# Patient Record
Sex: FEMALE | Race: WHITE | NOT HISPANIC OR LATINO | Employment: FULL TIME | URBAN - METROPOLITAN AREA
[De-identification: names, ages, dates, MRNs, and addresses within clinical notes are randomized per-mention and may not be internally consistent; named-entity substitution may affect disease eponyms.]

---

## 2017-05-12 ENCOUNTER — TELEPHONE (OUTPATIENT)
Dept: FAMILY MEDICINE CLINIC | Facility: CLINIC | Age: 57
End: 2017-05-12

## 2017-05-12 RX ORDER — ESZOPICLONE 3 MG/1
TABLET, FILM COATED ORAL
Qty: 30 TABLET | Refills: 2 | OUTPATIENT
Start: 2017-05-12

## 2017-05-12 RX ORDER — LIDOCAINE 50 MG/G
PATCH TOPICAL
Refills: 4 | OUTPATIENT
Start: 2017-05-12

## 2017-06-07 ENCOUNTER — OFFICE VISIT (OUTPATIENT)
Dept: FAMILY MEDICINE CLINIC | Facility: CLINIC | Age: 57
End: 2017-06-07

## 2017-06-07 VITALS
OXYGEN SATURATION: 99 % | HEIGHT: 65 IN | SYSTOLIC BLOOD PRESSURE: 106 MMHG | DIASTOLIC BLOOD PRESSURE: 58 MMHG | HEART RATE: 83 BPM | WEIGHT: 129 LBS | TEMPERATURE: 98.9 F | BODY MASS INDEX: 21.49 KG/M2

## 2017-06-07 DIAGNOSIS — G25.81 RESTLESS LEG SYNDROME: Primary | ICD-10-CM

## 2017-06-07 DIAGNOSIS — G47.09 OTHER INSOMNIA: ICD-10-CM

## 2017-06-07 DIAGNOSIS — Z79.890 HORMONE REPLACEMENT THERAPY (HRT): ICD-10-CM

## 2017-06-07 PROCEDURE — 99213 OFFICE O/P EST LOW 20 MIN: CPT | Performed by: FAMILY MEDICINE

## 2017-06-07 RX ORDER — ROPINIROLE 0.5 MG/1
0.5 TABLET, FILM COATED ORAL NIGHTLY
Qty: 30 TABLET | Refills: 11 | Status: SHIPPED | OUTPATIENT
Start: 2017-06-07 | End: 2018-06-26 | Stop reason: SDUPTHER

## 2017-06-07 RX ORDER — GABAPENTIN 100 MG/1
CAPSULE ORAL
Qty: 60 CAPSULE | Refills: 11 | Status: SHIPPED | OUTPATIENT
Start: 2017-06-07 | End: 2018-10-12

## 2017-06-07 RX ORDER — VALACYCLOVIR HYDROCHLORIDE 1 G/1
TABLET, FILM COATED ORAL
Refills: 0 | COMMUNITY
Start: 2017-05-30 | End: 2019-03-28 | Stop reason: SDUPTHER

## 2017-06-07 NOTE — PROGRESS NOTES
Subjective   Lanny Andersen is a 56 y.o. female    HPI Comments: Patient presents for recheck regarding restless leg syndrome, insomnia and hormone replacement therapy.  She is due for prescription refills.  She has weaned herself down on her Requip to 0.5 mg at bedtime.  She has found that using gabapentin works as well for sleep as the Lunesta did without any morning hangover symptoms.  She needs a refill on her combo patch for hormone replacement therapy.  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.  She has no acute medical complaints today.    Insomnia   Pertinent negatives include no abdominal pain, arthralgias, chest pain, coughing, fatigue, headaches, myalgias, nausea, vomiting or weakness.       The following portions of the patient's history were reviewed and updated as appropriate: allergies, current medications, past social history and problem list    Review of Systems   Constitutional: Negative for fatigue and unexpected weight change.   Respiratory: Negative for cough and shortness of breath.    Cardiovascular: Negative for chest pain and palpitations.   Gastrointestinal: Negative for abdominal pain, nausea and vomiting.   Endocrine: Negative for polydipsia, polyphagia and polyuria.   Musculoskeletal: Negative for arthralgias and myalgias.   Neurological: Negative for tremors, weakness, light-headedness and headaches.   Psychiatric/Behavioral: Positive for sleep disturbance. Negative for agitation, behavioral problems, confusion, decreased concentration, dysphoric mood and hallucinations. The patient has insomnia. The patient is not nervous/anxious and is not hyperactive.        Objective     Vitals:    06/07/17 1353   BP: 106/58   Pulse: 83   Temp: 98.9 °F (37.2 °C)   SpO2: 99%       Physical Exam   Constitutional: She is oriented to person, place, and time. She appears well-developed and well-nourished. No distress.   HENT:   Head: Normocephalic and atraumatic.   Eyes: Conjunctivae are normal.  Pupils are equal, round, and reactive to light.   Neck: No thyromegaly present.   Cardiovascular: Normal rate and regular rhythm.    Pulmonary/Chest: Effort normal and breath sounds normal.   Lymphadenopathy:     She has no cervical adenopathy.   Neurological: She is alert and oriented to person, place, and time. Coordination normal.   Skin: Skin is warm and dry. She is not diaphoretic.   Psychiatric: She has a normal mood and affect. Her behavior is normal. Judgment and thought content normal. Cognition and memory are normal. She is attentive.   Nursing note and vitals reviewed.      Assessment/Plan   Problem List Items Addressed This Visit        Other    Restless leg syndrome - Primary    Relevant Medications    rOPINIRole (REQUIP) 0.5 MG tablet    Hormone replacement therapy (HRT)    Relevant Medications    estradiol-norethindrone (COMBIPATCH) 0.05-0.14 MG/DAY patch (Start on 6/8/2017)      Other Visit Diagnoses     Other insomnia        Relevant Medications    gabapentin (NEURONTIN) 100 MG capsule

## 2017-07-19 ENCOUNTER — TELEPHONE (OUTPATIENT)
Dept: FAMILY MEDICINE CLINIC | Facility: CLINIC | Age: 57
End: 2017-07-19

## 2017-07-19 RX ORDER — ESZOPICLONE 2 MG/1
TABLET, FILM COATED ORAL
Qty: 45 TABLET | Refills: 5 | OUTPATIENT
Start: 2017-07-19 | End: 2018-10-12

## 2017-07-19 NOTE — TELEPHONE ENCOUNTER
Patient was seen on 06/08. Pharmacy request request refill on lunesta 2 mg 1.5 nightly per patient.

## 2018-02-15 RX ORDER — LIDOCAINE 50 MG/G
PATCH TOPICAL
Refills: 4 | OUTPATIENT
Start: 2018-02-15

## 2018-02-15 RX ORDER — ESZOPICLONE 2 MG/1
TABLET, FILM COATED ORAL
Qty: 30 TABLET | Refills: 4 | OUTPATIENT
Start: 2018-02-15

## 2018-02-15 NOTE — TELEPHONE ENCOUNTER
Last OV 06/07/2017  No upcoming appt  Refill?      Lidocaine External Patch 5 %  Will file in chart as: lidocaine (LIDODERM) 5 %  APPLY 2 PATCHES EXTERNALLY ONE TIME A DAY        Disp: Not specified (Pharmacy requested 60)   Refills: 4     Class: Normal Start: 2/12/2018   To be filled at: [id number] - [place], [place] - [address] - [phone]  - [phone] FXPhone: [phone]          Eszopiclone Oral Tablet 2 MG  Will file in chart as: eszopiclone (LUNESTA) 2 MG tablet  TAKE 1 AND 1/2 TABLETS BY MOUTH AT BEDTIME        Disp: 30 tablet (Pharmacy requested 30)   Refills: 4     Class: Normal Start: 2/15/2018   Documented:1 year ago  Last refill:12/26/2017

## 2018-05-10 ENCOUNTER — TELEPHONE (OUTPATIENT)
Dept: FAMILY MEDICINE CLINIC | Facility: CLINIC | Age: 58
End: 2018-05-10

## 2018-05-10 NOTE — TELEPHONE ENCOUNTER
----- Message from Deja Schaefer sent at 5/9/2018  4:28 PM EDT -----  Contact: PT  CALLED FOR 2ND TIME TODAY ABOUT GABAPENTIN Rx, HAS QUESTIONS ABOUT DOSAGE AND NEEDS REFILL SENT TO [facility] [address]

## 2018-05-10 NOTE — TELEPHONE ENCOUNTER
Notified patient verbally that this medication is now controlled and she has not been here in almost a year. She will need a follow up appt

## 2018-05-10 NOTE — TELEPHONE ENCOUNTER
----- Message from Deja Schaefer sent at 5/10/2018  1:12 PM EDT -----  Contact: PT  3RD CALL FROM PT ABOUT GABAPENTIN REFILL

## 2018-05-10 NOTE — TELEPHONE ENCOUNTER
----- Message from Deja Schaefer sent at 5/9/2018 10:00 AM EDT -----  Contact: PT  GABAPENTIN RX NEEDS TO BE SENT In, SHE HAS DOSAGE QUESTIONS FIRST THOUGH AND NEEDS TO BE CALLED FIRST. [phone]      [facility] [place], [place] - [facility] - [phone]  - [phone] (Phone)  [phone] (Fax)

## 2018-05-16 ENCOUNTER — OFFICE VISIT (OUTPATIENT)
Dept: FAMILY MEDICINE CLINIC | Facility: CLINIC | Age: 58
End: 2018-05-16

## 2018-05-16 VITALS
DIASTOLIC BLOOD PRESSURE: 60 MMHG | TEMPERATURE: 98.3 F | WEIGHT: 128 LBS | SYSTOLIC BLOOD PRESSURE: 124 MMHG | HEART RATE: 76 BPM | HEIGHT: 65 IN | BODY MASS INDEX: 21.33 KG/M2 | OXYGEN SATURATION: 98 %

## 2018-05-16 DIAGNOSIS — G47.09 OTHER INSOMNIA: ICD-10-CM

## 2018-05-16 DIAGNOSIS — G89.29 CHRONIC NECK PAIN: Primary | ICD-10-CM

## 2018-05-16 DIAGNOSIS — G25.81 RESTLESS LEG SYNDROME: ICD-10-CM

## 2018-05-16 DIAGNOSIS — M54.2 CHRONIC NECK PAIN: Primary | ICD-10-CM

## 2018-05-16 PROCEDURE — 99213 OFFICE O/P EST LOW 20 MIN: CPT | Performed by: PHYSICIAN ASSISTANT

## 2018-05-16 NOTE — PROGRESS NOTES
Subjective   Lanny Andersen is a 57 y.o. 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  All 3 symptoms are currently stable on 300 mg of gabapentin.  She states she does better with the tablet is able to swallow more easily than the capsules.  She denies any adverse effects this medication specifically states that she is not having dizziness drowsiness or confusion and has been able to essentially eliminate her Requip because this also helps with her restless leg syndrome and rarely takes Lunesta because the gabapentin helps with her insomnia.  The following portions of the patient's history were reviewed and updated as appropriate: allergies, current medications, past social history and problem list    Review of Systems   Musculoskeletal: Positive for neck pain ( Chronically stable on gabapentin nightly ).   Neurological: Negative for tremors ( Restless leg syndrome stable with gabapentin, rarely taking Requip.).   Psychiatric/Behavioral: Negative for sleep disturbance.        Patient's chronic insomnia is well controlled and stable with gabapentin now, rarely having to take Lunesta.       Objective     Vitals:    05/16/18 1021   BP: 124/60   Pulse: 76   Temp: 98.3 °F (36.8 °C)   SpO2: 98%       Physical Exam   Constitutional: She is oriented to person, place, and time. She appears well-developed and well-nourished. No distress.   HENT:   Head: Normocephalic and atraumatic.   Neck: Normal range of motion. Neck supple.   Cardiovascular: Normal rate.    Pulmonary/Chest: Effort normal.   Neurological: She is alert and oriented to person, place, and time. No cranial nerve deficit.   Skin: She is not diaphoretic.   Psychiatric: She has a normal mood and affect. Her behavior is normal. Judgment and thought content  normal.   Nursing note and vitals reviewed.      Assessment/Plan     Diagnoses and all orders for this visit:    Chronic neck pain    Restless leg syndrome    Other insomnia    Refill given on gabapentin 600 mg tablet half nightly #15 with 5 refills, discussed abuse potential and controlled substance status of this product, controlled substance agreement reviewed and signed today.  Follow-up in office in 6 months for recheck.

## 2018-06-19 DIAGNOSIS — Z79.890 HORMONE REPLACEMENT THERAPY (HRT): ICD-10-CM

## 2018-06-25 RX ORDER — ESTRADIOL/NORETHINDRONE ACETATE TRANSDERMAL SYSTEM .05; .14 MG/D; MG/D
PATCH, EXTENDED RELEASE TRANSDERMAL
Qty: 1 EACH | Refills: 5 | Status: SHIPPED | OUTPATIENT
Start: 2018-06-25 | End: 2019-03-28 | Stop reason: SDUPTHER

## 2018-06-26 DIAGNOSIS — G25.81 RESTLESS LEG SYNDROME: ICD-10-CM

## 2018-06-28 RX ORDER — ROPINIROLE 0.25 MG/1
TABLET, FILM COATED ORAL
Qty: 60 TABLET | Refills: 1 | Status: SHIPPED | OUTPATIENT
Start: 2018-06-28 | End: 2019-03-28

## 2018-10-03 RX ORDER — GABAPENTIN 600 MG/1
TABLET ORAL
Qty: 15 TABLET | Refills: 0 | OUTPATIENT
Start: 2018-10-03

## 2018-10-12 ENCOUNTER — OFFICE VISIT (OUTPATIENT)
Dept: FAMILY MEDICINE CLINIC | Facility: CLINIC | Age: 58
End: 2018-10-12

## 2018-10-12 VITALS
OXYGEN SATURATION: 99 % | TEMPERATURE: 97.8 F | DIASTOLIC BLOOD PRESSURE: 68 MMHG | HEIGHT: 65 IN | HEART RATE: 78 BPM | WEIGHT: 130 LBS | SYSTOLIC BLOOD PRESSURE: 124 MMHG | BODY MASS INDEX: 21.66 KG/M2

## 2018-10-12 DIAGNOSIS — G25.81 RESTLESS LEG SYNDROME: Primary | ICD-10-CM

## 2018-10-12 DIAGNOSIS — M54.2 CHRONIC NECK PAIN: ICD-10-CM

## 2018-10-12 DIAGNOSIS — M54.12 RADICULOPATHY, CERVICAL: ICD-10-CM

## 2018-10-12 DIAGNOSIS — G89.29 CHRONIC NECK PAIN: ICD-10-CM

## 2018-10-12 PROCEDURE — 99213 OFFICE O/P EST LOW 20 MIN: CPT | Performed by: PHYSICIAN ASSISTANT

## 2018-10-12 RX ORDER — GABAPENTIN 600 MG/1
TABLET ORAL
Refills: 2 | COMMUNITY
Start: 2018-09-06 | End: 2019-03-28

## 2018-10-12 NOTE — PROGRESS NOTES
Subjective   Lanny Andersen is a 57 y.o. female  Peripheral Neuropathy (follow up on neuropathy and RLS, req refills on Gabapentin)      History of Present Illness  Patient comes in today for follow-up on restless leg syndrome and chronic neck pain with associated upper extremity neuropathy following parathyroidectomy.  She states that in the winter she ends up having more pain in her neck and more pain with her neuropathy.  She has been taking 300 mg nightly of gabapentin, she cuts of 600 mg tablet in half but she states that over the past several weeks the dosage does not seem to be high enough.  She feels 600 mg is too high but often times she will cut the pill in half and then add on an additional fourth of a tablet.  She states this is caused her to be low on her medication and have to go without medicine some nights which worsens her pain.  She would like to try increasing the dosage to between 4-500 mg.  The following portions of the patient's history were reviewed and updated as appropriate: allergies, current medications, past social history and problem list    Review of Systems   Constitutional: Negative.    HENT: Negative for sore throat, trouble swallowing and voice change.    Respiratory: Negative for shortness of breath.    Musculoskeletal: Positive for arthralgias, neck pain and neck stiffness.   Skin: Negative for rash and wound.   Neurological: Positive for numbness. Negative for dizziness, tremors, weakness and light-headedness.   Hematological: Negative for adenopathy.       Objective     Vitals:    10/12/18 0814   BP: 124/68   Pulse: 78   Temp: 97.8 °F (36.6 °C)   SpO2: 99%       Physical Exam   Constitutional: She is oriented to person, place, and time. She appears well-developed and well-nourished. No distress.   HENT:   Head: Normocephalic and atraumatic.   Neck: No JVD present. Spinous process tenderness and muscular tenderness present. Decreased range of motion present. No tracheal  deviation present. No thyromegaly present.   Pulmonary/Chest: Effort normal and breath sounds normal. No stridor.   Lymphadenopathy:     She has no cervical adenopathy.   Neurological: She is alert and oriented to person, place, and time. She displays normal reflexes. No cranial nerve deficit or sensory deficit. She exhibits normal muscle tone. Coordination normal.   Skin: Skin is warm and dry. She is not diaphoretic.   Psychiatric: She has a normal mood and affect. Her behavior is normal. Judgment and thought content normal.   Nursing note and vitals reviewed.      Assessment/Plan     Diagnoses and all orders for this visit:    Restless leg syndrome    Chronic neck pain    Radiculopathy, cervical    Other orders  -     gabapentin (NEURONTIN) 600 MG tablet; TAKE 1/2 TABLET BY MOUTH AT BEDTIME FOR NEUROPATHY    New dosage written for gabapentin change in dose to 800 mg tablet half to 1 nightly for restless leg syndrome and radiculopathy #30 with 5 refills, discussed abuse potential is medication and common adverse effects, follow-up in 6 months for recheck.

## 2018-10-12 NOTE — PROGRESS NOTES
I have reviewed the notes, assessments, and/or procedures performed by Nia Veras PA-C, I concur with her documentation of Lanny Andersen.

## 2019-03-28 ENCOUNTER — OFFICE VISIT (OUTPATIENT)
Dept: FAMILY MEDICINE CLINIC | Facility: CLINIC | Age: 59
End: 2019-03-28

## 2019-03-28 VITALS
BODY MASS INDEX: 20.99 KG/M2 | HEIGHT: 65 IN | TEMPERATURE: 97.9 F | HEART RATE: 81 BPM | SYSTOLIC BLOOD PRESSURE: 104 MMHG | WEIGHT: 126 LBS | OXYGEN SATURATION: 99 % | DIASTOLIC BLOOD PRESSURE: 62 MMHG

## 2019-03-28 DIAGNOSIS — G25.81 RESTLESS LEG SYNDROME: Primary | ICD-10-CM

## 2019-03-28 DIAGNOSIS — B00.1 FEVER BLISTER: ICD-10-CM

## 2019-03-28 DIAGNOSIS — Z79.890 HORMONE REPLACEMENT THERAPY (HRT): ICD-10-CM

## 2019-03-28 PROCEDURE — 99213 OFFICE O/P EST LOW 20 MIN: CPT | Performed by: PHYSICIAN ASSISTANT

## 2019-03-28 RX ORDER — VALACYCLOVIR HYDROCHLORIDE 1 G/1
1000 TABLET, FILM COATED ORAL 3 TIMES DAILY
Qty: 15 TABLET | Refills: 2 | Status: SHIPPED | OUTPATIENT
Start: 2019-03-28 | End: 2020-04-17 | Stop reason: SDUPTHER

## 2019-03-28 RX ORDER — GABAPENTIN 800 MG/1
TABLET ORAL
Refills: 5 | COMMUNITY
Start: 2019-03-09 | End: 2020-04-17 | Stop reason: SDUPTHER

## 2019-03-28 NOTE — PROGRESS NOTES
Subjective   Lanny Andersen is a 58 y.o. female  Restless Legs Syndrome (Follow up on RLS, req refills on Gabapentin ) and Hormone therapy  (Req refills on Combipatch for HRT therapy)      History of Present Illness  Patient comes in today for follow-up on restless leg syndrome and menopausal syndrome.  Both chronic medical conditions are currently stable on medication she is due for refills.  Denies any adverse effects from meds.  The following portions of the patient's history were reviewed and updated as appropriate: allergies, current medications, past social history and problem list    Review of Systems   Constitutional: Negative for fatigue and unexpected weight change.   Respiratory: Negative.    Endocrine: Negative.         Stable on medication   Neurological: Negative.  Negative for tremors, weakness, light-headedness and headaches.   Psychiatric/Behavioral: Positive for sleep disturbance ( Stable on medication). Negative for agitation, behavioral problems, confusion, decreased concentration, dysphoric mood and hallucinations. The patient is not nervous/anxious and is not hyperactive.        Objective     Vitals:    03/28/19 0932   BP: 104/62   Pulse: 81   Temp: 97.9 °F (36.6 °C)   SpO2: 99%       Physical Exam   Constitutional: She is oriented to person, place, and time. She appears well-developed and well-nourished. No distress.   HENT:   Head: Normocephalic and atraumatic.   Right Ear: Hearing and tympanic membrane normal.   Left Ear: Hearing and tympanic membrane normal.   Eyes: Pupils are equal, round, and reactive to light.   Neck: Normal carotid pulses present. Carotid bruit is not present.   Cardiovascular: Normal rate, regular rhythm and normal heart sounds.   Pulmonary/Chest: Effort normal and breath sounds normal. No respiratory distress.   Musculoskeletal: Normal range of motion.   Neurological: She is alert and oriented to person, place, and time. She displays normal reflexes. No cranial nerve  deficit. She exhibits normal muscle tone. Coordination normal.   Skin: She is not diaphoretic.   Psychiatric: She has a normal mood and affect. Her behavior is normal. Judgment and thought content normal.   Nursing note and vitals reviewed.      Assessment/Plan     Diagnoses and all orders for this visit:    Restless leg syndrome    Hormone replacement therapy (HRT)  -     estradiol-norethindrone (COMBIPATCH) 0.05-0.14 MG/DAY patch; Place 1 patch on the skin as directed by provider 2 (Two) Times a Week.    Fever blister    Other orders  -     gabapentin (NEURONTIN) 800 MG tablet; TAKE 1/2-1 TABLET BY MOUTH AT BEDTIME AS NEEDED FOR NEUROPATHY  -     valACYclovir (VALTREX) 1000 MG tablet; Take 1 tablet by mouth 3 (Three) Times a Day.    Patient is going to schedule appointment Dr. Young for her breast exam and Pap smear and mammogram.

## 2019-09-22 DIAGNOSIS — Z79.890 HORMONE REPLACEMENT THERAPY (HRT): ICD-10-CM

## 2019-10-21 ENCOUNTER — OFFICE VISIT (OUTPATIENT)
Dept: FAMILY MEDICINE CLINIC | Facility: CLINIC | Age: 59
End: 2019-10-21

## 2019-10-21 VITALS
DIASTOLIC BLOOD PRESSURE: 76 MMHG | OXYGEN SATURATION: 99 % | HEART RATE: 71 BPM | WEIGHT: 127 LBS | TEMPERATURE: 98.1 F | HEIGHT: 65 IN | BODY MASS INDEX: 21.16 KG/M2 | SYSTOLIC BLOOD PRESSURE: 120 MMHG

## 2019-10-21 DIAGNOSIS — G25.81 RESTLESS LEG SYNDROME: Primary | ICD-10-CM

## 2019-10-21 PROCEDURE — 99213 OFFICE O/P EST LOW 20 MIN: CPT | Performed by: PHYSICIAN ASSISTANT

## 2019-10-21 NOTE — PROGRESS NOTES
Subjective   Lanny Andersen is a 59 y.o. female  Peripheral Neuropathy (Follow up on neuropathy, req refills on Gabapentin )      History of Present Illness  Patient comes in today for follow-up on restless leg syndrome stable on gabapentin 800 mg nightly.  Denies any adverse effects or medication resting much better at night with his new dosage.  Dosage was changed last visit.  The following portions of the patient's history were reviewed and updated as appropriate: allergies, current medications, past social history and problem list    Review of Systems   Constitutional: Negative.    Respiratory: Negative.    Cardiovascular: Negative.    Neurological: Positive for numbness ( Stable on medication). Negative for dizziness and light-headedness.   Psychiatric/Behavioral: Positive for sleep disturbance ( Stable on medication).        Stable on medication       Objective     Vitals:    10/21/19 1111   BP: 120/76   Pulse: 71   Temp: 98.1 °F (36.7 °C)   SpO2: 99%       Physical Exam   Constitutional: She is oriented to person, place, and time. She appears well-developed and well-nourished. No distress.   Eyes: Conjunctivae are normal.   Cardiovascular: Normal rate and regular rhythm.   Pulmonary/Chest: Effort normal and breath sounds normal.   Neurological: She is alert and oriented to person, place, and time. She displays normal reflexes. No cranial nerve deficit. She exhibits normal muscle tone. Coordination normal.   Skin: She is not diaphoretic.   Psychiatric: She has a normal mood and affect. Her behavior is normal. Judgment and thought content normal. Cognition and memory are normal. She is attentive.   Nursing note and vitals reviewed.      Assessment/Plan     Diagnoses and all orders for this visit:    Restless leg syndrome    Refill gabapentin 800 mg 1 nightly for restless leg syndrome #30 with 5 refills discussed abuse potential medication discussed need to follow-up in 6 months for recheckTomas reviewed,  appropriate.

## 2019-11-08 RX ORDER — ESTRADIOL/NORETHINDRONE ACETATE TRANSDERMAL SYSTEM .05; .14 MG/D; MG/D
PATCH, EXTENDED RELEASE TRANSDERMAL
Qty: 8 EACH | Refills: 4 | Status: SHIPPED | OUTPATIENT
Start: 2019-11-08 | End: 2020-12-23

## 2020-04-14 ENCOUNTER — TELEPHONE (OUTPATIENT)
Dept: FAMILY MEDICINE CLINIC | Facility: CLINIC | Age: 60
End: 2020-04-14

## 2020-04-14 RX ORDER — GABAPENTIN 800 MG/1
TABLET ORAL
Refills: 5 | Status: CANCELLED | OUTPATIENT
Start: 2020-04-14

## 2020-04-14 NOTE — TELEPHONE ENCOUNTER
Gabapentin is a controlled medication that requires a follow up visit, Telephone visit scheduled for Friday

## 2020-04-17 ENCOUNTER — OFFICE VISIT (OUTPATIENT)
Dept: FAMILY MEDICINE CLINIC | Facility: CLINIC | Age: 60
End: 2020-04-17

## 2020-04-17 DIAGNOSIS — M54.2 CHRONIC NECK PAIN: ICD-10-CM

## 2020-04-17 DIAGNOSIS — G25.81 RESTLESS LEG SYNDROME: Primary | ICD-10-CM

## 2020-04-17 DIAGNOSIS — M54.12 RADICULOPATHY, CERVICAL: ICD-10-CM

## 2020-04-17 DIAGNOSIS — G89.29 CHRONIC NECK PAIN: ICD-10-CM

## 2020-04-17 DIAGNOSIS — M54.12 RADICULOPATHY, CERVICAL: Primary | ICD-10-CM

## 2020-04-17 PROCEDURE — 99442 PR PHYS/QHP TELEPHONE EVALUATION 11-20 MIN: CPT | Performed by: PHYSICIAN ASSISTANT

## 2020-04-17 RX ORDER — VALACYCLOVIR HYDROCHLORIDE 1 G/1
1000 TABLET, FILM COATED ORAL 3 TIMES DAILY
Qty: 15 TABLET | Refills: 2 | Status: SHIPPED | OUTPATIENT
Start: 2020-04-17 | End: 2021-05-03

## 2020-04-17 RX ORDER — GABAPENTIN 800 MG/1
800 TABLET ORAL DAILY
Qty: 30 TABLET | Refills: 5 | OUTPATIENT
Start: 2020-04-17 | End: 2022-11-11 | Stop reason: SDUPTHER

## 2020-04-17 NOTE — PROGRESS NOTES
Subjective   Lanny Andersen is a 59 y.o. female  Peripheral Neuropathy (Follow up on neuropathy, req refills on Gabapentin )      History of Present Illness  Patient presents today for an office visit through a telephone visit option after consenting to this as her choice of office visit.  She states that her radiculopathy and restless leg syndrome are currently stable on gabapentin, she is due for refills.  She has had some increased pain in her legs with the cold temperature recently but feels that her current dosage is still controlling her pain at a tolerable level.  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  Denies any adverse effects from medications doing well with tolerability of this medication.  Telephone visit lasted 12 minutes.  The following portions of the patient's history were reviewed and updated as appropriate: allergies, current medications, past social history and problem list    Review of Systems   Constitutional: Positive for activity change.   Musculoskeletal: Positive for arthralgias and myalgias. Negative for gait problem and joint swelling.   Skin: Negative.    Neurological: Negative for weakness and numbness.   Psychiatric/Behavioral: Negative.        Objective     There were no vitals filed for this visit.    Physical Exam   Constitutional: She is oriented to person, place, and time. She appears well-developed and well-nourished.   Musculoskeletal: She exhibits no edema, tenderness or deformity.   Neurological: She is alert and oriented to person, place, and time.   Skin: Skin is warm and dry. No rash noted. No erythema. No pallor.   Psychiatric: She has a normal mood and affect. Her behavior is normal. Judgment and thought content normal.       Assessment/Plan     Lanny was seen today for peripheral neuropathy.    Diagnoses and all orders for  this visit:    Restless leg syndrome    Radiculopathy, cervical    Other orders  -     valACYclovir (VALTREX) 1000 MG tablet; Take 1 tablet by mouth 3 (Three) Times a Day.    Prescription sent in for gabapentin 800 mg 1 daily for radiculopathy/restless leg syndrome #30 with 5 refills discussed abuse potential and controlled substance status of medication and need to follow-up in office in 6 months for recheck.

## 2020-04-17 NOTE — TELEPHONE ENCOUNTER
Patient had appt with janeth via telephone. Gabapentin was called into the patients [facility]. MD will need to sign off, Fwd to MD

## 2020-12-23 DIAGNOSIS — Z12.31 BREAST CANCER SCREENING BY MAMMOGRAM: Primary | ICD-10-CM

## 2020-12-23 DIAGNOSIS — Z79.890 HORMONE REPLACEMENT THERAPY (HRT): ICD-10-CM

## 2020-12-23 RX ORDER — ESTRADIOL/NORETHINDRONE ACETATE TRANSDERMAL SYSTEM .05; .14 MG/D; MG/D
1 PATCH, EXTENDED RELEASE TRANSDERMAL 2 TIMES WEEKLY
Qty: 8 EACH | Refills: 4 | Status: SHIPPED | OUTPATIENT
Start: 2020-12-24 | End: 2021-09-15 | Stop reason: SDUPTHER

## 2020-12-23 NOTE — TELEPHONE ENCOUNTER
LVm for patient   
Last seen on 04/17/20  
Please notify patient I have sent in refills for 5 months worth.  I also placed an order for the mammography department to contact her to schedule a mammogram that want her to have done within the next 5 months.  Is required to have a mammogram annually for refill of estrogen replacement.  
No

## 2021-05-03 RX ORDER — VALACYCLOVIR HYDROCHLORIDE 1 G/1
TABLET, FILM COATED ORAL
Qty: 15 TABLET | Refills: 0 | Status: SHIPPED | OUTPATIENT
Start: 2021-05-03 | End: 2021-09-15 | Stop reason: SDUPTHER

## 2021-09-15 ENCOUNTER — OFFICE VISIT (OUTPATIENT)
Dept: FAMILY MEDICINE CLINIC | Facility: CLINIC | Age: 61
End: 2021-09-15

## 2021-09-15 VITALS
WEIGHT: 131 LBS | TEMPERATURE: 97 F | DIASTOLIC BLOOD PRESSURE: 72 MMHG | BODY MASS INDEX: 21.83 KG/M2 | HEART RATE: 80 BPM | HEIGHT: 65 IN | SYSTOLIC BLOOD PRESSURE: 114 MMHG | OXYGEN SATURATION: 99 %

## 2021-09-15 DIAGNOSIS — Z86.39 HISTORY OF HYPERPARATHYROIDISM: ICD-10-CM

## 2021-09-15 DIAGNOSIS — Z12.11 COLON CANCER SCREENING: ICD-10-CM

## 2021-09-15 DIAGNOSIS — M54.12 RADICULOPATHY, CERVICAL: ICD-10-CM

## 2021-09-15 DIAGNOSIS — Z12.31 BREAST CANCER SCREENING BY MAMMOGRAM: ICD-10-CM

## 2021-09-15 DIAGNOSIS — R53.83 FATIGUE, UNSPECIFIED TYPE: ICD-10-CM

## 2021-09-15 DIAGNOSIS — Z79.890 HORMONE REPLACEMENT THERAPY (HRT): ICD-10-CM

## 2021-09-15 DIAGNOSIS — Z00.00 GENERAL MEDICAL EXAM: Primary | ICD-10-CM

## 2021-09-15 DIAGNOSIS — Z11.59 ENCOUNTER FOR HEPATITIS C SCREENING TEST FOR LOW RISK PATIENT: ICD-10-CM

## 2021-09-15 PROCEDURE — 99396 PREV VISIT EST AGE 40-64: CPT | Performed by: PHYSICIAN ASSISTANT

## 2021-09-15 RX ORDER — VALACYCLOVIR HYDROCHLORIDE 1 G/1
1000 TABLET, FILM COATED ORAL 3 TIMES DAILY
Qty: 15 TABLET | Refills: 11 | Status: SHIPPED | OUTPATIENT
Start: 2021-09-15 | End: 2022-09-22

## 2021-09-15 RX ORDER — ESTRADIOL/NORETHINDRONE ACETATE TRANSDERMAL SYSTEM .05; .14 MG/D; MG/D
1 PATCH, EXTENDED RELEASE TRANSDERMAL 2 TIMES WEEKLY
Qty: 8 EACH | Refills: 11 | Status: SHIPPED | OUTPATIENT
Start: 2021-09-16 | End: 2022-09-28

## 2021-09-15 NOTE — PROGRESS NOTES
"Subjective   Lanny Andersen is a 60 y.o. female  Annual Exam (Annual physical with labs and referral for mammogram ) and Med Refill (req med refills on Gabapentin and Valtrex)      History of Present Illness  Patient presents today for a preventive medical visit.  Patient is here to determine screening labs and tests that are due and to determine immunization status as well.  Patient will be counseled regarding preventative medicine issues such as regular exercise and  healthy diet as well.  The following portions of the patient's history were reviewed and updated as appropriate: allergies, current medications, past social history and problem list    Review of Systems   Constitutional: Positive for fatigue.        Patient states her last couple week she has been feeling somewhat fatigued with a bit of fogginess feeling \"grainy\" sensation that she describes as the same as she felt when she needed to have her parathyroid gland removed 10 years ago.  She states that she feels better this week but was concerned about this and wanted to get some labs drawn.   HENT: Negative.    Eyes: Negative.    Respiratory: Negative.    Cardiovascular: Negative.    Gastrointestinal: Negative.    Endocrine: Positive for heat intolerance ( Stable on hormonal patch).   Genitourinary: Negative.    Musculoskeletal: Negative.    Skin: Negative.    Allergic/Immunologic: Negative.    Neurological: Negative.    Hematological: Negative.    Psychiatric/Behavioral: Negative.    All other systems reviewed and are negative.      Objective     Vitals:    09/15/21 0907   BP: 114/72   Pulse: 80   Temp: 97 °F (36.1 °C)   SpO2: 99%       Physical Exam  Vitals and nursing note reviewed.   Constitutional:       General: She is not in acute distress.     Appearance: Normal appearance. She is well-developed. She is not ill-appearing, toxic-appearing or diaphoretic.   HENT:      Head: Normocephalic and atraumatic.      Right Ear: External ear normal.      " Left Ear: External ear normal.      Nose: Nose normal.   Eyes:      Conjunctiva/sclera: Conjunctivae normal.      Pupils: Pupils are equal, round, and reactive to light.   Neck:      Thyroid: No thyromegaly.      Vascular: No carotid bruit or JVD.   Cardiovascular:      Rate and Rhythm: Normal rate and regular rhythm.      Heart sounds: Normal heart sounds. No murmur heard.     Pulmonary:      Effort: Pulmonary effort is normal.      Breath sounds: Normal breath sounds.   Abdominal:      General: Bowel sounds are normal.      Palpations: Abdomen is soft. There is no mass.      Tenderness: There is no abdominal tenderness.   Musculoskeletal:         General: Normal range of motion.      Cervical back: Normal range of motion and neck supple.   Lymphadenopathy:      Cervical: No cervical adenopathy.   Skin:     General: Skin is warm and dry.      Coloration: Skin is not pale.      Findings: No erythema or rash.   Neurological:      Mental Status: She is alert and oriented to person, place, and time.      Cranial Nerves: No cranial nerve deficit.      Motor: No weakness.      Coordination: Coordination normal.      Gait: Gait normal.      Deep Tendon Reflexes: Reflexes are normal and symmetric.   Psychiatric:         Mood and Affect: Mood normal.         Behavior: Behavior normal.         Thought Content: Thought content normal.         Judgment: Judgment normal.       Discussed preventative medicine issues with patient including regular exercise, healthy diet, stress reduction, adequate sleep and recommended age-appropriate screening studies.  Assessment/Plan     Diagnoses and all orders for this visit:    1. General medical exam (Primary)  -     Lipid Panel; Future    2. Breast cancer screening by mammogram  -     Mammo Screening Bilateral With CAD; Future    3. Colon cancer screening  -     Ambulatory Referral For Screening Colonoscopy    4. Fatigue, unspecified type    5. History of hyperparathyroidism  -      Comprehensive metabolic panel; Future  -     CBC (No Diff); Future  -     TSH; Future  -     T4, Free; Future  -     Calcium, Ionized; Future    6. Hormone replacement therapy (HRT)  -     estradiol-norethindrone (CombiPatch) 0.05-0.14 MG/DAY patch; Place 1 patch on the skin as directed by provider 2 (Two) Times a Week.  Dispense: 8 each; Refill: 11    7. Encounter for hepatitis C screening test for low risk patient  -     Hepatitis C Antibody; Future    8. Radiculopathy, cervical    Other orders  -     valACYclovir (VALTREX) 1000 MG tablet; Take 1 tablet by mouth 3 (Three) Times a Day.  Dispense: 15 tablet; Refill: 11    I will send a letter to patient with my detailed interpretation of patient's labs after I myself have received and reviewed the above ordered labs.    Please note that portions of this document were completed with a voice recognition program. Efforts were made to edit the dictations, but occasionally words are mis-transcribed     Refill gabapentin current dosage of 800 mg 1 nightly as needed for neuropathy/restless leg #30 with 5 refills.

## 2021-12-02 ENCOUNTER — TRANSCRIBE ORDERS (OUTPATIENT)
Dept: ADMINISTRATIVE | Facility: HOSPITAL | Age: 61
End: 2021-12-02

## 2021-12-02 DIAGNOSIS — Z12.31 VISIT FOR SCREENING MAMMOGRAM: Primary | ICD-10-CM

## 2022-01-07 ENCOUNTER — APPOINTMENT (OUTPATIENT)
Dept: MAMMOGRAPHY | Facility: HOSPITAL | Age: 62
End: 2022-01-07

## 2022-02-04 ENCOUNTER — HOSPITAL ENCOUNTER (OUTPATIENT)
Dept: MAMMOGRAPHY | Facility: HOSPITAL | Age: 62
Discharge: HOME | End: 2022-02-04
Admitting: PHYSICIAN ASSISTANT

## 2022-02-04 DIAGNOSIS — Z12.31 VISIT FOR SCREENING MAMMOGRAM: ICD-10-CM

## 2022-02-04 PROCEDURE — 77067 SCR MAMMO BI INCL CAD: CPT

## 2022-02-04 PROCEDURE — 77063 BREAST TOMOSYNTHESIS BI: CPT | Performed by: RADIOLOGY

## 2022-02-04 PROCEDURE — 77063 BREAST TOMOSYNTHESIS BI: CPT

## 2022-02-04 PROCEDURE — 77067 SCR MAMMO BI INCL CAD: CPT | Performed by: RADIOLOGY

## 2022-03-18 ENCOUNTER — HOSPITAL ENCOUNTER (OUTPATIENT)
Dept: MAMMOGRAPHY | Facility: HOSPITAL | Age: 62
Discharge: HOME | End: 2022-03-18

## 2022-03-18 ENCOUNTER — HOSPITAL ENCOUNTER (OUTPATIENT)
Dept: ULTRASOUND IMAGING | Facility: HOSPITAL | Age: 62
Discharge: HOME | End: 2022-03-18

## 2022-03-18 DIAGNOSIS — R92.8 ABNORMAL MAMMOGRAM: ICD-10-CM

## 2022-03-18 PROCEDURE — 76642 ULTRASOUND BREAST LIMITED: CPT | Performed by: RADIOLOGY

## 2022-03-18 PROCEDURE — 77066 DX MAMMO INCL CAD BI: CPT | Performed by: RADIOLOGY

## 2022-03-18 PROCEDURE — 77066 DX MAMMO INCL CAD BI: CPT

## 2022-03-18 PROCEDURE — 77062 BREAST TOMOSYNTHESIS BI: CPT | Performed by: RADIOLOGY

## 2022-03-18 PROCEDURE — 76642 ULTRASOUND BREAST LIMITED: CPT

## 2022-03-18 PROCEDURE — G0279 TOMOSYNTHESIS, MAMMO: HCPCS

## 2022-04-25 DIAGNOSIS — M54.2 CHRONIC NECK PAIN: ICD-10-CM

## 2022-04-25 DIAGNOSIS — M54.12 RADICULOPATHY, CERVICAL: ICD-10-CM

## 2022-04-25 DIAGNOSIS — G89.29 CHRONIC NECK PAIN: ICD-10-CM

## 2022-04-25 RX ORDER — GABAPENTIN 800 MG/1
TABLET ORAL
Qty: 30 TABLET | Refills: 0 | OUTPATIENT
Start: 2022-04-25

## 2022-09-22 RX ORDER — VALACYCLOVIR HYDROCHLORIDE 1 G/1
TABLET, FILM COATED ORAL
Qty: 15 TABLET | Refills: 0 | Status: SHIPPED | OUTPATIENT
Start: 2022-09-22 | End: 2022-11-11 | Stop reason: SDUPTHER

## 2022-09-27 DIAGNOSIS — Z79.890 HORMONE REPLACEMENT THERAPY (HRT): ICD-10-CM

## 2022-09-28 RX ORDER — ESTRADIOL/NORETHINDRONE ACETATE TRANSDERMAL SYSTEM .05; .14 MG/D; MG/D
1 PATCH, EXTENDED RELEASE TRANSDERMAL 2 TIMES WEEKLY
Qty: 8 EACH | Refills: 1 | Status: SHIPPED | OUTPATIENT
Start: 2022-09-29 | End: 2022-11-11 | Stop reason: SDUPTHER

## 2022-11-11 ENCOUNTER — OFFICE VISIT (OUTPATIENT)
Dept: FAMILY MEDICINE CLINIC | Facility: CLINIC | Age: 62
End: 2022-11-11

## 2022-11-11 VITALS
HEIGHT: 65 IN | TEMPERATURE: 97.2 F | BODY MASS INDEX: 21.66 KG/M2 | SYSTOLIC BLOOD PRESSURE: 128 MMHG | WEIGHT: 130 LBS | HEART RATE: 75 BPM | OXYGEN SATURATION: 98 % | DIASTOLIC BLOOD PRESSURE: 62 MMHG

## 2022-11-11 DIAGNOSIS — G89.29 CHRONIC NECK PAIN: ICD-10-CM

## 2022-11-11 DIAGNOSIS — Z51.81 ENCOUNTER FOR THERAPEUTIC DRUG MONITORING: Primary | ICD-10-CM

## 2022-11-11 DIAGNOSIS — E21.3 HYPERPARATHYROIDISM: ICD-10-CM

## 2022-11-11 DIAGNOSIS — M54.12 RADICULOPATHY, CERVICAL: ICD-10-CM

## 2022-11-11 DIAGNOSIS — M54.2 CHRONIC NECK PAIN: ICD-10-CM

## 2022-11-11 DIAGNOSIS — Z11.59 ENCOUNTER FOR HEPATITIS C SCREENING TEST FOR LOW RISK PATIENT: ICD-10-CM

## 2022-11-11 DIAGNOSIS — E07.9 THYROID CONDITION: ICD-10-CM

## 2022-11-11 DIAGNOSIS — E16.2 HYPOGLYCEMIC DISORDER: Primary | ICD-10-CM

## 2022-11-11 DIAGNOSIS — Z79.890 HORMONE REPLACEMENT THERAPY (HRT): ICD-10-CM

## 2022-11-11 DIAGNOSIS — Z12.11 COLON CANCER SCREENING: ICD-10-CM

## 2022-11-11 PROCEDURE — 99214 OFFICE O/P EST MOD 30 MIN: CPT | Performed by: PHYSICIAN ASSISTANT

## 2022-11-11 RX ORDER — GABAPENTIN 800 MG/1
800 TABLET ORAL DAILY PRN
Qty: 30 TABLET | Refills: 5 | Status: SHIPPED | OUTPATIENT
Start: 2022-11-11

## 2022-11-11 RX ORDER — VALACYCLOVIR HYDROCHLORIDE 1 G/1
1000 TABLET, FILM COATED ORAL 3 TIMES DAILY
Qty: 30 TABLET | Refills: 11 | Status: SHIPPED | OUTPATIENT
Start: 2022-11-11

## 2022-11-11 RX ORDER — GABAPENTIN 800 MG/1
800 TABLET ORAL DAILY PRN
Qty: 30 TABLET | Refills: 5 | OUTPATIENT
Start: 2022-11-11 | End: 2022-11-11 | Stop reason: SDUPTHER

## 2022-11-11 RX ORDER — ESTRADIOL/NORETHINDRONE ACETATE TRANSDERMAL SYSTEM .05; .14 MG/D; MG/D
1 PATCH, EXTENDED RELEASE TRANSDERMAL 2 TIMES WEEKLY
Qty: 8 EACH | Refills: 12 | Status: SHIPPED | OUTPATIENT
Start: 2022-11-14

## 2022-11-11 NOTE — PROGRESS NOTES
Subjective   Lanny Andersen is a 62 y.o. female  HORMONE REPLACEMENT THERAPY (Follow up on HRT, refills on combipatch to [facility] ), Mouth Lesions (Refill on valtrex for intermittent cold sores- [facility]), and Peripheral Neuropathy (Refill on gabapentin, for intermittent neuropathy- [facility])      History of Present Illness     The patient presents today for a follow-up on hormonal replacement for menopause, peripheral neuropathy, and cold sores.     The patient reports that her blood glucose seems to drop at work. She states that she has checked her blood sugar in the morning, and it was 120 mg/dL. She reports that she does not feel shaky, but she does feel lightheaded at times.     The patient experiences fever blisters, especially when she is travelling. Also, the winter weather will cause her to have fever blisters.  She reports that she uses Valtrex which resolves the blisters.    The patient states that she had part of her thyroid removed, but she did not need to take any thyroid medication. The patient states that she does snack on candy at work. She denies having had a colonoscopy. She would like to have the Cologuard test. She denies having a recent Pap smear. She reports that the CombiPatch is working well, and she is not having any vaginal issues or breakthrough bleeding. The patient states that she has had a mammogram and it was normal. She denies any lumps in her breasts or any other issues. The patient did receive her influenza vaccine. She denies receiving the Shingrix vaccine. She reports that she takes 0.5 tablet of gabapentin 800 mg at night sparingly, but she administers it more in the winter because of joint pain.     The patient reports neck pain with tingling radiating down her lower extremities. She adds that she has exercises from physical therapy that she performs, and it helps. She does state that she is experiencing numbness currently.    The patient works at [employer]    The following portions of the patient's history were reviewed and updated as appropriate: allergies, current medications, past social history and problem list    Review of Systems   Constitutional: Negative for activity change.   Respiratory: Negative.    Cardiovascular: Negative.    Genitourinary: Negative.    Musculoskeletal: Positive for arthralgias and myalgias. Negative for gait problem and joint swelling.        Stable on medication   Skin: Negative.    Neurological: Negative for weakness and numbness.   Psychiatric/Behavioral: Negative.        Objective     Vitals:    11/11/22 1117   BP: 128/62   Pulse: 75   Temp: 97.2 °F (36.2 °C)   SpO2: 98%       Physical Exam  Constitutional:       General: She is not in acute distress.     Appearance: Normal appearance. She is well-developed. She is not ill-appearing, toxic-appearing or diaphoretic.   HENT:      Head: Normocephalic and atraumatic.   Eyes:      Conjunctiva/sclera: Conjunctivae normal.   Pulmonary:      Effort: Pulmonary effort is normal. No respiratory distress.   Skin:     General: Skin is dry.      Coloration: Skin is not pale.      Findings: No erythema or rash.   Neurological:      Mental Status: She is alert and oriented to person, place, and time.      Coordination: Coordination normal.   Psychiatric:         Attention and Perception: She is attentive.         Mood and Affect: Mood normal.         Speech: Speech normal.         Behavior: Behavior normal.         Thought Content: Thought content normal.         Judgment: Judgment normal.         Assessment & Plan     Diagnoses and all orders for this visit:    1. Hypoglycemic disorder (Primary)  -     Comprehensive metabolic panel; Future  -     Hemoglobin A1c; Future    2. Hyperparathyroidism (HCC)  -     Comprehensive metabolic panel; Future  -     CBC (No Diff); Future    3. Thyroid condition  -     TSH; Future  -     T4, Free; Future  -     Comprehensive metabolic panel; Future  -      CBC (No Diff); Future    4. Colon cancer screening  -     Cologuard - Stool, Per Rectum; Future    5. Encounter for hepatitis C screening test for low risk patient  -     Hepatitis C Antibody; Future    6. Hormone replacement therapy (HRT)  -     estradiol-norethindrone (CombiPatch) 0.05-0.14 MG/DAY patch; Place 1 patch on the skin as directed by provider 2 (Two) Times a Week.  Dispense: 8 each; Refill: 12    7. Radiculopathy, cervical  -     Discontinue: gabapentin (NEURONTIN) 800 MG tablet; Take 1 tablet by mouth Daily As Needed (neuropathy). For neuropathy  Dispense: 30 tablet; Refill: 5  -     gabapentin (NEURONTIN) 800 MG tablet; Take 1 tablet by mouth Daily As Needed (neuropathy). For neuropathy  Dispense: 30 tablet; Refill: 5    8. Chronic neck pain  -     Discontinue: gabapentin (NEURONTIN) 800 MG tablet; Take 1 tablet by mouth Daily As Needed (neuropathy). For neuropathy  Dispense: 30 tablet; Refill: 5  -     gabapentin (NEURONTIN) 800 MG tablet; Take 1 tablet by mouth Daily As Needed (neuropathy). For neuropathy  Dispense: 30 tablet; Refill: 5    Other orders  -     valACYclovir (VALTREX) 1000 MG tablet; Take 1 tablet by mouth 3 (Three) Times a Day.  Dispense: 30 tablet; Refill: 11    The patient is doing well on her current medication regimen. We will obtain labs today including a complete blood count, metabolic panel, A1c, and full thyroid panel. We will also obtain a Cologuard for colon cancer screening. She is due for a Pap smear in 1 year, but she is not due for a mammogram at this time. She is due for a Shingrix vaccine, which she will discuss with her pharmacist. She will continue on gabapentin 800 mg at night for her joint pain. She will follow up in 1 year.    Updated controlled substance agreement and urine drug screen today.  Follow-up in 6 to 12 months as needed for refills.    As part of this patient's treatment plan, patient will be prescribed controlled substances. The patient has been  made aware of appropriate use of such medications, including potential risk of somnolence, limited ability to drive and /or work safely, and potential for dependence or overdose. It has also been made clear that these medications are for use by this patient only, without concomitant use of alcohol or other substances unless prescribed.Controlled substance status of medication discussed with patient, discussed risks of medication including abuse potential and diversion potential and need to follow up for reevaluation appointment in order to receive further refills.    Part of this note may be an electronic transcription/translation of spoken language to printed text using the Dragon Dictation System.        Transcribed from ambient dictation for Nia Veras PA-C by Yoon Hector.  11/11/22   12:34 EST    Patient or patient representative verbalized consent to the visit recording.  I have personally performed the services described in this document as transcribed by the above individual, and it is both accurate and complete.  Nia Vreas PA-C  11/11/2022  12:45 EST

## 2022-11-19 LAB — DRUGS UR: NORMAL

## 2022-12-02 ENCOUNTER — OFFICE VISIT (OUTPATIENT)
Dept: FAMILY MEDICINE CLINIC | Facility: CLINIC | Age: 62
End: 2022-12-02

## 2022-12-02 VITALS
HEIGHT: 64 IN | HEART RATE: 77 BPM | RESPIRATION RATE: 14 BRPM | WEIGHT: 133.2 LBS | DIASTOLIC BLOOD PRESSURE: 72 MMHG | OXYGEN SATURATION: 99 % | SYSTOLIC BLOOD PRESSURE: 148 MMHG | BODY MASS INDEX: 22.74 KG/M2 | TEMPERATURE: 97.1 F

## 2022-12-02 DIAGNOSIS — U07.1 COVID-19 VIRUS INFECTION: Primary | ICD-10-CM

## 2022-12-02 PROCEDURE — 99213 OFFICE O/P EST LOW 20 MIN: CPT | Performed by: PHYSICIAN ASSISTANT

## 2022-12-02 NOTE — PROGRESS NOTES
Subjective   Lanny Andersen is a 62 y.o. female  Cough  Body aches    History of Present Illness  Patient is a pleasant 62-year-old female who comes in complaining of body aches cough sinus pressure sinus congestion, body aches has been worse over the last couple of days patient tested positive in the office today for COVID  The following portions of the patient's history were reviewed and updated as appropriate: allergies, current medications, past social history and problem list    Review of Systems   Constitutional: Negative for fatigue and unexpected weight change.   HENT: Positive for postnasal drip, sinus pressure, sinus pain and sore throat.    Respiratory: Negative for cough, chest tightness and shortness of breath.    Cardiovascular: Negative for chest pain, palpitations and leg swelling.   Gastrointestinal: Negative for nausea.   Skin: Negative for color change and rash.   Neurological: Negative for dizziness, syncope, weakness and headaches.       Objective     Vitals:    12/02/22 1117   BP: 148/72   Pulse: 77   Resp: 14   Temp: 97.1 °F (36.2 °C)   SpO2: 99%       Physical Exam  Vitals and nursing note reviewed.   Constitutional:       General: She is not in acute distress.     Appearance: Normal appearance. She is well-developed. She is not ill-appearing, toxic-appearing or diaphoretic.   HENT:      Nose: Congestion and rhinorrhea present.   Neck:      Vascular: No carotid bruit or JVD.   Cardiovascular:      Rate and Rhythm: Normal rate and regular rhythm.      Pulses: Normal pulses.      Heart sounds: Normal heart sounds. No murmur heard.  Pulmonary:      Effort: Pulmonary effort is normal. No respiratory distress.      Breath sounds: Normal breath sounds.   Abdominal:      Palpations: Abdomen is soft.      Tenderness: There is no abdominal tenderness.   Skin:     General: Skin is warm and dry.   Neurological:      Mental Status: She is alert.         Assessment & Plan     Diagnoses and all orders for  this visit:    1. COVID-19 virus infection (Primary)    Patient has been vaccinated, patient was encouraged to increase fluid intake Tylenol Motrin for fever Mucinex for congestion off work for 5 to 7 days    I spent 15 minutes in patient care: Reviewing records prior to the visit, examining the patient, entering orders and documentation    Part of this note may be an electronic transcription/translation of spoken language to printed text using the Dragon Dictation System.

## 2023-01-06 ENCOUNTER — LAB (OUTPATIENT)
Dept: LAB | Facility: HOSPITAL | Age: 63
End: 2023-01-06
Payer: COMMERCIAL

## 2023-01-06 DIAGNOSIS — E07.9 THYROID CONDITION: ICD-10-CM

## 2023-01-06 DIAGNOSIS — Z11.59 ENCOUNTER FOR HEPATITIS C SCREENING TEST FOR LOW RISK PATIENT: ICD-10-CM

## 2023-01-06 DIAGNOSIS — E21.3 HYPERPARATHYROIDISM: ICD-10-CM

## 2023-01-06 DIAGNOSIS — E16.2 HYPOGLYCEMIC DISORDER: ICD-10-CM

## 2023-01-06 LAB
DEPRECATED RDW RBC AUTO: 43.1 FL (ref 37–54)
ERYTHROCYTE [DISTWIDTH] IN BLOOD BY AUTOMATED COUNT: 12.9 % (ref 12.3–15.4)
HCT VFR BLD AUTO: 37.3 % (ref 34–46.6)
HGB BLD-MCNC: 12.9 G/DL (ref 12–15.9)
MCH RBC QN AUTO: 32.2 PG (ref 26.6–33)
MCHC RBC AUTO-ENTMCNC: 34.6 G/DL (ref 31.5–35.7)
MCV RBC AUTO: 93 FL (ref 79–97)
PLATELET # BLD AUTO: 310 10*3/MM3 (ref 140–450)
PMV BLD AUTO: 10.6 FL (ref 6–12)
RBC # BLD AUTO: 4.01 10*6/MM3 (ref 3.77–5.28)
WBC NRBC COR # BLD: 5.73 10*3/MM3 (ref 3.4–10.8)

## 2023-01-06 PROCEDURE — 86803 HEPATITIS C AB TEST: CPT

## 2023-01-06 PROCEDURE — 83036 HEMOGLOBIN GLYCOSYLATED A1C: CPT

## 2023-01-06 PROCEDURE — 80050 GENERAL HEALTH PANEL: CPT

## 2023-01-06 PROCEDURE — 36415 COLL VENOUS BLD VENIPUNCTURE: CPT

## 2023-01-06 PROCEDURE — 84439 ASSAY OF FREE THYROXINE: CPT

## 2023-01-07 LAB
ALBUMIN SERPL-MCNC: 4.6 G/DL (ref 3.5–5.2)
ALBUMIN/GLOB SERPL: 1.8 G/DL
ALP SERPL-CCNC: 64 U/L (ref 39–117)
ALT SERPL W P-5'-P-CCNC: 12 U/L (ref 1–33)
ANION GAP SERPL CALCULATED.3IONS-SCNC: 12.3 MMOL/L (ref 5–15)
AST SERPL-CCNC: 22 U/L (ref 1–32)
BILIRUB SERPL-MCNC: 0.4 MG/DL (ref 0–1.2)
BUN SERPL-MCNC: 12 MG/DL (ref 8–23)
BUN/CREAT SERPL: 14.8 (ref 7–25)
CALCIUM SPEC-SCNC: 9.6 MG/DL (ref 8.6–10.5)
CHLORIDE SERPL-SCNC: 95 MMOL/L (ref 98–107)
CO2 SERPL-SCNC: 27.7 MMOL/L (ref 22–29)
CREAT SERPL-MCNC: 0.81 MG/DL (ref 0.57–1)
EGFRCR SERPLBLD CKD-EPI 2021: 82.2 ML/MIN/1.73
GLOBULIN UR ELPH-MCNC: 2.5 GM/DL
GLUCOSE SERPL-MCNC: 110 MG/DL (ref 65–99)
HBA1C MFR BLD: 5.6 % (ref 4.8–5.6)
HCV AB SER DONR QL: NORMAL
POTASSIUM SERPL-SCNC: 4.2 MMOL/L (ref 3.5–5.2)
PROT SERPL-MCNC: 7.1 G/DL (ref 6–8.5)
SODIUM SERPL-SCNC: 135 MMOL/L (ref 136–145)
T4 FREE SERPL-MCNC: 1.04 NG/DL (ref 0.93–1.7)
TSH SERPL DL<=0.05 MIU/L-ACNC: 2.76 UIU/ML (ref 0.27–4.2)

## 2023-02-08 ENCOUNTER — TELEPHONE (OUTPATIENT)
Dept: FAMILY MEDICINE CLINIC | Facility: CLINIC | Age: 63
End: 2023-02-08
Payer: COMMERCIAL

## 2023-02-08 DIAGNOSIS — Z13.220 LIPID SCREENING: ICD-10-CM

## 2023-02-08 DIAGNOSIS — E21.3 HYPERPARATHYROIDISM: Primary | ICD-10-CM

## 2023-02-08 NOTE — TELEPHONE ENCOUNTER
----- Message from Lanny Andersen sent at 2/8/2023 11:08 AM EST -----  Regarding: Recent labs   Contact: [phone]  Possible for me to get an order for a cholesterol panel and also an ionized calcium number ? I need this for a work health screening and to determine function of my parathyroid glands.Thank you!

## 2023-02-16 ENCOUNTER — LAB (OUTPATIENT)
Dept: LAB | Facility: HOSPITAL | Age: 63
End: 2023-02-16
Payer: COMMERCIAL

## 2023-02-16 DIAGNOSIS — E21.3 HYPERPARATHYROIDISM: ICD-10-CM

## 2023-02-16 DIAGNOSIS — Z13.220 LIPID SCREENING: ICD-10-CM

## 2023-02-16 LAB
CA-I BLD-MCNC: 5.3 MG/DL (ref 4.6–5.4)
CA-I SERPL ISE-MCNC: 1.32 MMOL/L (ref 1.15–1.35)
CHOLEST SERPL-MCNC: 227 MG/DL (ref 0–200)
HDLC SERPL-MCNC: 88 MG/DL (ref 40–60)
LDLC SERPL CALC-MCNC: 116 MG/DL (ref 0–100)
LDLC/HDLC SERPL: 1.28 {RATIO}
TRIGL SERPL-MCNC: 132 MG/DL (ref 0–150)
VLDLC SERPL-MCNC: 23 MG/DL (ref 5–40)

## 2023-02-16 PROCEDURE — 82330 ASSAY OF CALCIUM: CPT

## 2023-02-16 PROCEDURE — 80061 LIPID PANEL: CPT

## 2023-03-17 ENCOUNTER — TELEPHONE (OUTPATIENT)
Dept: FAMILY MEDICINE CLINIC | Facility: CLINIC | Age: 63
End: 2023-03-17
Payer: COMMERCIAL

## 2023-03-17 DIAGNOSIS — Z12.11 COLON CANCER SCREENING: Primary | ICD-10-CM

## 2023-03-17 NOTE — TELEPHONE ENCOUNTER
----- Message from Lanny Andersen sent at 3/16/2023  7:34 PM EDT -----  Regarding: Colonoscopy   Contact: [phone]  Slight traces of blood and bloating/gas

## 2023-03-17 NOTE — TELEPHONE ENCOUNTER
Referral to gastroenterology placed they are running about 2 weeks time frame on calling patients to schedule if she does not hear anything in 2 weeks let me know

## 2023-03-17 NOTE — TELEPHONE ENCOUNTER
Hello,  I’ve had recent symptoms that lead me to believe I may need a colonoscopy even though my Cologuard test was fine. Could I get one ordered?  Thank you,  Lanny

## 2024-01-03 ENCOUNTER — TELEPHONE (OUTPATIENT)
Dept: FAMILY MEDICINE CLINIC | Facility: CLINIC | Age: 64
End: 2024-01-03
Payer: COMMERCIAL

## 2024-01-03 DIAGNOSIS — M54.2 CHRONIC NECK PAIN: ICD-10-CM

## 2024-01-03 DIAGNOSIS — G89.29 CHRONIC NECK PAIN: ICD-10-CM

## 2024-01-03 DIAGNOSIS — M54.12 RADICULOPATHY, CERVICAL: ICD-10-CM

## 2024-01-03 RX ORDER — GABAPENTIN 800 MG/1
800 TABLET ORAL DAILY PRN
Qty: 30 TABLET | Refills: 5 | OUTPATIENT
Start: 2024-01-03

## 2024-01-03 NOTE — TELEPHONE ENCOUNTER
Caller: Lanny Andersen    Relationship: Self    Best call back number: [phone]     Requested Prescriptions:   Requested Prescriptions     Pending Prescriptions Disp Refills    gabapentin (NEURONTIN) 800 MG tablet 30 tablet 5     Sig: Take 1 tablet by mouth Daily As Needed (neuropathy). For neuropathy        Pharmacy where request should be sent: [id number] - [place], [place] - [address] - [phone]  - [phone] FX     Last office visit with prescribing clinician: 11/11/2022   Last telemedicine visit with prescribing clinician: Visit date not found   Next office visit with prescribing clinician: 1/8/2024     Additional details provided by patient: IS OUT. HELPS HER SLEEP. PLEASE SEND IN.    Does the patient have less than a 3 day supply:  [x] Yes  [] No    Would you like a call back once the refill request has been completed: [] Yes [x] No    If the office needs to give you a call back, can they leave a voicemail: [] Yes [x] No    Izabel Shirley Rep   01/03/24 10:23 EST

## 2024-01-03 NOTE — TELEPHONE ENCOUNTER
Caller: Lanny Andersen    Relationship to patient: Self    Best call back number:  [phone]     Chief complaint: EXTREME FATIGUE, HANDS SWOLLEN, LEGS ITCHING, NIGHT SWEATS AND FAMILY OF NON HODGKIN'S LYMPHOMA (MOTHER PASSED AWAY).    Type of visit: OFFICE/SAME DAY    Requested date: 1/4/24 AFTER 2:15 OR 1/5/24 BEFORE 1PM.    If rescheduling, when is the original appointment: 1/8/24      Additional notes: PATIENT IS CONCERNED AND NEEDS TO BE SEEN ASAP. PLEASE ADVISE WHEN CAN WORK HER IN.

## 2024-01-08 ENCOUNTER — LAB (OUTPATIENT)
Dept: LAB | Facility: HOSPITAL | Age: 64
End: 2024-01-08
Payer: COMMERCIAL

## 2024-01-08 ENCOUNTER — HOSPITAL ENCOUNTER (OUTPATIENT)
Dept: GENERAL RADIOLOGY | Facility: HOSPITAL | Age: 64
Discharge: HOME | End: 2024-01-08
Payer: COMMERCIAL

## 2024-01-08 ENCOUNTER — OFFICE VISIT (OUTPATIENT)
Dept: FAMILY MEDICINE CLINIC | Facility: CLINIC | Age: 64
End: 2024-01-08
Payer: COMMERCIAL

## 2024-01-08 VITALS
HEART RATE: 73 BPM | SYSTOLIC BLOOD PRESSURE: 122 MMHG | DIASTOLIC BLOOD PRESSURE: 68 MMHG | BODY MASS INDEX: 22.72 KG/M2 | TEMPERATURE: 97.8 F | OXYGEN SATURATION: 97 % | WEIGHT: 134.4 LBS

## 2024-01-08 DIAGNOSIS — R61 NIGHT SWEATS: ICD-10-CM

## 2024-01-08 DIAGNOSIS — G89.29 CHRONIC NECK PAIN: ICD-10-CM

## 2024-01-08 DIAGNOSIS — Z51.81 ENCOUNTER FOR THERAPEUTIC DRUG MONITORING: Primary | ICD-10-CM

## 2024-01-08 DIAGNOSIS — L29.9 CHRONIC PRURITUS: Primary | ICD-10-CM

## 2024-01-08 DIAGNOSIS — L29.9 CHRONIC PRURITUS: ICD-10-CM

## 2024-01-08 DIAGNOSIS — M54.2 CHRONIC NECK PAIN: ICD-10-CM

## 2024-01-08 DIAGNOSIS — Z79.890 HORMONE REPLACEMENT THERAPY (HRT): ICD-10-CM

## 2024-01-08 DIAGNOSIS — M54.12 RADICULOPATHY, CERVICAL: ICD-10-CM

## 2024-01-08 PROCEDURE — 80050 GENERAL HEALTH PANEL: CPT

## 2024-01-08 PROCEDURE — 36415 COLL VENOUS BLD VENIPUNCTURE: CPT

## 2024-01-08 PROCEDURE — 99214 OFFICE O/P EST MOD 30 MIN: CPT | Performed by: PHYSICIAN ASSISTANT

## 2024-01-08 PROCEDURE — 71046 X-RAY EXAM CHEST 2 VIEWS: CPT

## 2024-01-08 PROCEDURE — 84439 ASSAY OF FREE THYROXINE: CPT

## 2024-01-08 RX ORDER — GABAPENTIN 800 MG/1
800 TABLET ORAL DAILY PRN
Qty: 30 TABLET | Refills: 5 | Status: SHIPPED | OUTPATIENT
Start: 2024-01-08

## 2024-01-08 RX ORDER — ESTRADIOL/NORETHINDRONE ACETATE TRANSDERMAL SYSTEM .05; .14 MG/D; MG/D
1 PATCH, EXTENDED RELEASE TRANSDERMAL 2 TIMES WEEKLY
Qty: 8 EACH | Refills: 12 | Status: SHIPPED | OUTPATIENT
Start: 2024-01-08

## 2024-01-08 NOTE — PROGRESS NOTES
Subjective   Lanny Andersen is a 63 y.o. female  Back Pain (Req gabapentin for back pain ) and Menopause (Refill on combipatch , not taking patch as prescribed, having night sweats, depression, possible labs)      History of Present Illness  The patient presents today for follow-up on needing refills for gabapentin for chronic neck pain with radiculopathy which is currently stable needing refills.    The patient reports that she has not been applying her CombiPatch twice a week like she was supposed to. She changes her CombiPatch every 2 weeks to keep her hot flashes away. The patient denies any breakthrough bleeding. She thought she was going to start doing it once a week, but now she does it once every 10 days. The patient is having hot flashes.    The patient has sores on her legs that will not go away. She states that they have been there for 2 years. The patient reports that they are year round. Sometimes they are really itchy, but they are little red. The patient has them on her arms now. Her arms are not as bad in the summer, but they will not go away. The patient realized that the itchy legs and night sweats are symptoms of lymphoma. She states that her mother  of non-Hodgkin's lymphoma. The patient denies any fevers. She is having a little shortness of breath and a cough. The patient denies any lymph node swelling. She has never had a chest x-ray. The patient had a parathyroid gland removed in . She had a high blood count that has been fine ever since. The patient had a major neck surgery in 2012. Gabapentin is great for helping her sleep. The patient has been depressed.      The following portions of the patient's history were reviewed and updated as appropriate: allergies, current medications, past social history and problem list    Review of Systems   Respiratory:  Positive for cough.    Endocrine: Positive for heat intolerance.   Musculoskeletal:  Positive for back pain and myalgias.   Skin:   Positive for rash.   Psychiatric/Behavioral:  Positive for dysphoric mood.        Objective     Vitals:    01/08/24 1457   BP: 122/68   Pulse: 73   Temp: 97.8 °F (36.6 °C)   SpO2: 97%       Physical Exam  Vitals and nursing note reviewed.   Constitutional:       General: She is not in acute distress.     Appearance: Normal appearance. She is well-developed. She is not ill-appearing, toxic-appearing or diaphoretic.   HENT:      Head: Normocephalic and atraumatic.   Eyes:      Conjunctiva/sclera: Conjunctivae normal.   Neck:      Vascular: No JVD.   Cardiovascular:      Rate and Rhythm: Normal rate and regular rhythm.      Pulses: Normal pulses.      Heart sounds: Normal heart sounds. No murmur heard.  Pulmonary:      Effort: Pulmonary effort is normal. No respiratory distress.      Breath sounds: Normal breath sounds.   Chest:      Chest wall: No tenderness.   Abdominal:      General: There is no distension.      Palpations: Abdomen is soft. There is no mass.      Tenderness: There is no abdominal tenderness.   Musculoskeletal:         General: No swelling.   Skin:     General: Skin is warm and dry.      Coloration: Skin is not pale.      Findings: Rash present. No erythema.      Comments: Several scattered crusted erythematous areas on lower extremities bilaterally but no excoriations no open wounds no vesicles   Neurological:      Mental Status: She is alert.      Coordination: Coordination normal.         Assessment & Plan     Diagnoses and all orders for this visit:    1. Chronic pruritus (Primary)  -     XR Chest PA & Lateral; Future  -     CBC & Differential; Future  -     Comprehensive metabolic panel; Future  -     TSH; Future  -     T4, Free; Future    2. Hormone replacement therapy (HRT)  -     estradiol-norethindrone (CombiPatch) 0.05-0.14 MG/DAY patch; Place 1 patch on the skin as directed by provider 2 (Two) Times a Week.  Dispense: 8 each; Refill: 12    3. Radiculopathy, cervical  -     gabapentin  (NEURONTIN) 800 MG tablet; Take 1 tablet by mouth Daily As Needed (neuropathy). For neuropathy  Dispense: 30 tablet; Refill: 5    4. Chronic neck pain  -     gabapentin (NEURONTIN) 800 MG tablet; Take 1 tablet by mouth Daily As Needed (neuropathy). For neuropathy  Dispense: 30 tablet; Refill: 5    5. Night sweats  -     CBC & Differential; Future  -     Comprehensive metabolic panel; Future  -     TSH; Future  -     T4, Free; Future       As part of this patient's treatment plan, patient will be prescribed controlled substances. The patient has been made aware of appropriate use of such medications, including potential risk of somnolence, limited ability to drive and /or work safely, and potential for dependence or overdose. It has also been made clear that these medications are for use by this patient only, without concomitant use of alcohol or other substances unless prescribed.Controlled substance status of medication discussed with patient, discussed risks of medication including abuse potential and diversion potential and need to follow up for reevaluation appointment in order to receive further refills.    Part of this note may be an electronic transcription/translation of spoken language to printed text using the Dragon Dictation System.      Controlled substance agreement updated urine drug screen obtained.  Follow-up in 6 months for recheck.  1. Hot flashes  - The patient will start using her CombiPatch twice a week.    2. Cough  - The patient will have a chest x-ray.    Transcribed from ambient dictation for Nia Veras PA-C by Helena Dutton.  01/08/24   17:49 EST    Patient or patient representative verbalized consent to the visit recording.  I have personally performed the services described in this document as transcribed by the above individual, and it is both accurate and complete.

## 2024-01-09 LAB
ALBUMIN SERPL-MCNC: 4.7 G/DL (ref 3.5–5.2)
ALBUMIN/GLOB SERPL: 1.9 G/DL
ALP SERPL-CCNC: 99 U/L (ref 39–117)
ALT SERPL W P-5'-P-CCNC: 11 U/L (ref 1–33)
ANION GAP SERPL CALCULATED.3IONS-SCNC: 13.1 MMOL/L (ref 5–15)
AST SERPL-CCNC: 17 U/L (ref 1–32)
BASOPHILS # BLD AUTO: 0.06 10*3/MM3 (ref 0–0.2)
BASOPHILS NFR BLD AUTO: 0.8 % (ref 0–1.5)
BILIRUB SERPL-MCNC: 0.2 MG/DL (ref 0–1.2)
BUN SERPL-MCNC: 13 MG/DL (ref 8–23)
BUN/CREAT SERPL: 14.4 (ref 7–25)
CALCIUM SPEC-SCNC: 9.8 MG/DL (ref 8.6–10.5)
CHLORIDE SERPL-SCNC: 99 MMOL/L (ref 98–107)
CO2 SERPL-SCNC: 27.9 MMOL/L (ref 22–29)
CREAT SERPL-MCNC: 0.9 MG/DL (ref 0.57–1)
DEPRECATED RDW RBC AUTO: 43.3 FL (ref 37–54)
EGFRCR SERPLBLD CKD-EPI 2021: 72 ML/MIN/1.73
EOSINOPHIL # BLD AUTO: 0.28 10*3/MM3 (ref 0–0.4)
EOSINOPHIL NFR BLD AUTO: 3.8 % (ref 0.3–6.2)
ERYTHROCYTE [DISTWIDTH] IN BLOOD BY AUTOMATED COUNT: 12.9 % (ref 12.3–15.4)
GLOBULIN UR ELPH-MCNC: 2.5 GM/DL
GLUCOSE SERPL-MCNC: 69 MG/DL (ref 65–99)
HCT VFR BLD AUTO: 37.3 % (ref 34–46.6)
HGB BLD-MCNC: 13.2 G/DL (ref 12–15.9)
IMM GRANULOCYTES # BLD AUTO: 0.03 10*3/MM3 (ref 0–0.05)
IMM GRANULOCYTES NFR BLD AUTO: 0.4 % (ref 0–0.5)
LYMPHOCYTES # BLD AUTO: 2.16 10*3/MM3 (ref 0.7–3.1)
LYMPHOCYTES NFR BLD AUTO: 29.1 % (ref 19.6–45.3)
MCH RBC QN AUTO: 32.9 PG (ref 26.6–33)
MCHC RBC AUTO-ENTMCNC: 35.4 G/DL (ref 31.5–35.7)
MCV RBC AUTO: 93 FL (ref 79–97)
MONOCYTES # BLD AUTO: 0.47 10*3/MM3 (ref 0.1–0.9)
MONOCYTES NFR BLD AUTO: 6.3 % (ref 5–12)
NEUTROPHILS NFR BLD AUTO: 4.41 10*3/MM3 (ref 1.7–7)
NEUTROPHILS NFR BLD AUTO: 59.6 % (ref 42.7–76)
NRBC BLD AUTO-RTO: 0 /100 WBC (ref 0–0.2)
PLATELET # BLD AUTO: 321 10*3/MM3 (ref 140–450)
PMV BLD AUTO: 10.1 FL (ref 6–12)
POTASSIUM SERPL-SCNC: 3.7 MMOL/L (ref 3.5–5.2)
PROT SERPL-MCNC: 7.2 G/DL (ref 6–8.5)
RBC # BLD AUTO: 4.01 10*6/MM3 (ref 3.77–5.28)
SODIUM SERPL-SCNC: 140 MMOL/L (ref 136–145)
T4 FREE SERPL-MCNC: 0.93 NG/DL (ref 0.93–1.7)
TSH SERPL DL<=0.05 MIU/L-ACNC: 5.02 UIU/ML (ref 0.27–4.2)
WBC NRBC COR # BLD AUTO: 7.41 10*3/MM3 (ref 3.4–10.8)

## 2024-01-09 RX ORDER — LEVOTHYROXINE SODIUM 0.05 MG/1
50 TABLET ORAL
Qty: 30 TABLET | Refills: 3 | Status: SHIPPED | OUTPATIENT
Start: 2024-01-09

## 2024-01-16 LAB — DRUGS UR: NORMAL

## 2024-01-23 ENCOUNTER — TELEPHONE (OUTPATIENT)
Dept: FAMILY MEDICINE CLINIC | Facility: CLINIC | Age: 64
End: 2024-01-23
Payer: COMMERCIAL

## 2024-01-23 NOTE — TELEPHONE ENCOUNTER
----- Message from Lanny Andersen sent at 1/23/2024  9:35 AM EST -----  Regarding: Mammogram   Contact: [phone]  [facility]..I am due for a follow up mammogram . Do I need an order from you for this?    Thanks,  Lanny

## 2024-01-26 DIAGNOSIS — Z12.31 BREAST CANCER SCREENING BY MAMMOGRAM: Primary | ICD-10-CM

## 2024-01-26 NOTE — TELEPHONE ENCOUNTER
I’ll go to the main campus at [facility] on [facility]. Thanks.

## 2024-02-02 DIAGNOSIS — R92.8 ABNORMAL MAMMOGRAM OF BOTH BREASTS: Primary | ICD-10-CM

## 2024-02-02 DIAGNOSIS — R92.8 ABNORMAL MAMMOGRAM: Primary | ICD-10-CM

## 2024-03-07 ENCOUNTER — TRANSCRIBE ORDERS (OUTPATIENT)
Dept: ADMINISTRATIVE | Facility: HOSPITAL | Age: 64
End: 2024-03-07
Payer: COMMERCIAL

## 2024-03-07 DIAGNOSIS — Z78.0 POST-MENOPAUSAL: Primary | ICD-10-CM

## 2024-03-07 DIAGNOSIS — Z13.820 OSTEOPOROSIS SCREENING: ICD-10-CM

## 2024-04-15 RX ORDER — VALACYCLOVIR HYDROCHLORIDE 1 G/1
1000 TABLET, FILM COATED ORAL 3 TIMES DAILY
Qty: 30 TABLET | Refills: 0 | Status: SHIPPED | OUTPATIENT
Start: 2024-04-15

## 2024-05-14 ENCOUNTER — TELEPHONE (OUTPATIENT)
Dept: FAMILY MEDICINE CLINIC | Facility: CLINIC | Age: 64
End: 2024-05-14

## 2024-05-14 NOTE — TELEPHONE ENCOUNTER
Provider: SHAE SHOEMAKER    Caller: [employer]    Relationship to Patient: PROVIDER    Phone Number: [phone]     Reason for Call: PATIENT'S INSURANCE PRESCRIPTION SAVINGS SERVICE WANTED TO LET US KNOW THAT HER OUT OF POCKET COSTS FOR HER SYNTHROID WOULD BE LOWER IF WE COULD HAVE HER FILL WITH THE GENERIC, DROPPING FROM $45.82 FOR 30 DAYS, DOWN TO $2.48 FOR 30 DAYS.

## 2024-05-16 DIAGNOSIS — E03.9 HYPOTHYROIDISM (ACQUIRED): Primary | ICD-10-CM

## 2024-05-16 RX ORDER — LEVOTHYROXINE SODIUM 50 MCG
50 TABLET ORAL EVERY MORNING
Qty: 90 TABLET | Refills: 0 | Status: SHIPPED | OUTPATIENT
Start: 2024-05-16

## 2024-05-20 ENCOUNTER — LAB (OUTPATIENT)
Dept: LAB | Facility: HOSPITAL | Age: 64
End: 2024-05-20
Payer: COMMERCIAL

## 2024-05-20 DIAGNOSIS — E03.9 HYPOTHYROIDISM (ACQUIRED): ICD-10-CM

## 2024-05-20 PROCEDURE — 84439 ASSAY OF FREE THYROXINE: CPT

## 2024-05-20 PROCEDURE — 84443 ASSAY THYROID STIM HORMONE: CPT

## 2024-05-20 PROCEDURE — 36415 COLL VENOUS BLD VENIPUNCTURE: CPT

## 2024-05-20 PROCEDURE — 84481 FREE ASSAY (FT-3): CPT

## 2024-05-21 LAB
T3FREE SERPL-MCNC: 2.26 PG/ML (ref 2–4.4)
T4 FREE SERPL-MCNC: 1.12 NG/DL (ref 0.93–1.7)
TSH SERPL DL<=0.05 MIU/L-ACNC: 1.44 UIU/ML (ref 0.27–4.2)

## 2024-08-23 LAB
NCCN CRITERIA FLAG: NORMAL
TYRER CUZICK SCORE: 7.4

## 2024-08-27 ENCOUNTER — HOSPITAL ENCOUNTER (OUTPATIENT)
Dept: MAMMOGRAPHY | Facility: HOSPITAL | Age: 64
Discharge: HOME | End: 2024-08-27
Payer: COMMERCIAL

## 2024-08-27 ENCOUNTER — HOSPITAL ENCOUNTER (OUTPATIENT)
Dept: ULTRASOUND IMAGING | Facility: HOSPITAL | Age: 64
Discharge: HOME | End: 2024-08-27
Payer: COMMERCIAL

## 2024-08-27 DIAGNOSIS — R92.8 ABNORMAL MAMMOGRAM: ICD-10-CM

## 2024-08-27 DIAGNOSIS — R92.8 ABNORMAL MAMMOGRAM OF BOTH BREASTS: ICD-10-CM

## 2024-08-27 PROCEDURE — G0279 TOMOSYNTHESIS, MAMMO: HCPCS

## 2024-08-27 PROCEDURE — 77066 DX MAMMO INCL CAD BI: CPT

## 2024-08-27 PROCEDURE — 76642 ULTRASOUND BREAST LIMITED: CPT

## 2024-09-20 RX ORDER — LEVOTHYROXINE SODIUM 50 MCG
50 TABLET ORAL EVERY MORNING
Qty: 90 TABLET | Refills: 3 | Status: SHIPPED | OUTPATIENT
Start: 2024-09-20

## 2025-01-31 DIAGNOSIS — Z79.890 HORMONE REPLACEMENT THERAPY (HRT): ICD-10-CM

## 2025-01-31 RX ORDER — ESTRADIOL/NORETHINDRONE ACETATE TRANSDERMAL SYSTEM .05; .14 MG/D; MG/D
PATCH, EXTENDED RELEASE TRANSDERMAL
Qty: 8 EACH | Refills: 8 | Status: SHIPPED | OUTPATIENT
Start: 2025-01-31

## 2025-02-07 ENCOUNTER — OFFICE VISIT (OUTPATIENT)
Dept: FAMILY MEDICINE CLINIC | Facility: CLINIC | Age: 65
End: 2025-02-07
Payer: COMMERCIAL

## 2025-02-07 VITALS
DIASTOLIC BLOOD PRESSURE: 84 MMHG | TEMPERATURE: 98.2 F | HEART RATE: 76 BPM | WEIGHT: 132.6 LBS | OXYGEN SATURATION: 96 % | BODY MASS INDEX: 22.09 KG/M2 | HEIGHT: 65 IN | SYSTOLIC BLOOD PRESSURE: 122 MMHG

## 2025-02-07 DIAGNOSIS — M54.12 RADICULOPATHY, CERVICAL: Primary | ICD-10-CM

## 2025-02-07 DIAGNOSIS — G89.29 CHRONIC NECK PAIN: ICD-10-CM

## 2025-02-07 DIAGNOSIS — G25.81 RESTLESS LEG SYNDROME: ICD-10-CM

## 2025-02-07 DIAGNOSIS — M54.2 CHRONIC NECK PAIN: ICD-10-CM

## 2025-02-07 DIAGNOSIS — Z51.81 ENCOUNTER FOR THERAPEUTIC DRUG MONITORING: ICD-10-CM

## 2025-02-07 DIAGNOSIS — F51.09 SITUATIONAL INSOMNIA: ICD-10-CM

## 2025-02-07 RX ORDER — TEMAZEPAM 15 MG/1
CAPSULE ORAL
Qty: 30 CAPSULE | Refills: 0 | Status: SHIPPED | OUTPATIENT
Start: 2025-02-07

## 2025-02-07 RX ORDER — GABAPENTIN 800 MG/1
800 TABLET ORAL DAILY PRN
Qty: 30 TABLET | Refills: 5 | Status: SHIPPED | OUTPATIENT
Start: 2025-02-07

## 2025-02-07 NOTE — PROGRESS NOTES
Subjective   Lanny Andersen is a 64 y.o. female  Neck Pain (Follow up on chronic neck pain, refill on gabapentin) and Insomnia (Discuss increased insomnia  due to brothers recent passing )      History of Present Illness  History of Present Illness  The patient is a 64-year-old female presenting today for follow-up on chronic neck pain with cervical radiculopathy and medication management of this condition, as well as restless leg syndrome. Additionally, she has a new concern regarding situational insomnia.    She has been experiencing sleep disturbances, which she attributes to the recent passing of her brother.  She recalls a similar episode of insomnia following her mother's death 7 years ago, for which she was prescribed temazepam 15 mg. She found this medication beneficial and is considering its use again to manage her current sleep issues, particularly during her upcoming travel to [place] for her brother's .    She has been managing her chronic neck pain with gabapentin, which she takes as needed. She reports that a single prescription of this medication lasts her an extended period, as she typically only requires half a tablet at a time.    Supplemental Information  She has plenty of Valtrex, CombiPatch, and Synthroid.    SOCIAL HISTORY  She drank half a bottle of wine to try to get to sleep.    MEDICATIONS  Current: gabapentin, Valtrex, CombiPatch, Synthroid  Past: temazepam    The following portions of the patient's history were reviewed and updated as appropriate: allergies, current medications, past social history and problem list    Review of Systems   Constitutional:  Negative for chills, diaphoresis, fatigue and fever.   HENT:  Negative for congestion and sore throat.    Respiratory:  Negative for cough.    Cardiovascular:  Negative for chest pain.   Gastrointestinal:  Negative for abdominal pain, nausea and vomiting.   Genitourinary:  Negative for dysuria.   Musculoskeletal:  Positive for  neck pain. Negative for myalgias.   Skin:  Negative for rash.   Neurological:  Negative for weakness, numbness and headaches.   Psychiatric/Behavioral:  Positive for sleep disturbance.        Objective     Vitals:    02/07/25 1530   BP: 122/84   Pulse: 76   Temp: 98.2 °F (36.8 °C)   SpO2: 96%       Physical Exam  Vitals and nursing note reviewed.   Constitutional:       General: She is not in acute distress.     Appearance: Normal appearance. She is well-developed. She is not ill-appearing, toxic-appearing or diaphoretic.   Eyes:      Conjunctiva/sclera: Conjunctivae normal.   Cardiovascular:      Rate and Rhythm: Normal rate and regular rhythm.   Pulmonary:      Effort: Pulmonary effort is normal.      Breath sounds: Normal breath sounds.   Musculoskeletal:         General: No tenderness. Normal range of motion.   Neurological:      Mental Status: She is alert and oriented to person, place, and time.      Sensory: No sensory deficit.      Coordination: Coordination normal.      Gait: Gait normal.   Psychiatric:         Attention and Perception: She is attentive.         Mood and Affect: Mood normal.         Behavior: Behavior normal.         Thought Content: Thought content normal.         Judgment: Judgment normal.       Physical Exam      Assessment & Plan   Assessment & Plan  1. Situational insomnia.  A prescription for temazepam 15 mg has been issued, with instructions to take 1 to 2 tablets nightly as needed for insomnia. #30. The prescription will be sent to [facility].    2. Chronic neck pain with cervical radiculopathy.  A refill of her gabapentin prescription has been provided. She is advised to continue taking gabapentin as needed, especially during her upcoming travel.    Refill given that current dosage for 6 months quantity, UDS obtained today, controlled substance agreement updated.  Follow-up in 6 months and as needed.    Diagnoses and all orders for this visit:    1. Radiculopathy, cervical  (Primary)  -     gabapentin (NEURONTIN) 800 MG tablet; Take 1 tablet by mouth Daily As Needed (neuropathy). For neuropathy  Dispense: 30 tablet; Refill: 5    2. Restless leg syndrome    3. Chronic neck pain  -     gabapentin (NEURONTIN) 800 MG tablet; Take 1 tablet by mouth Daily As Needed (neuropathy). For neuropathy  Dispense: 30 tablet; Refill: 5    4. Situational insomnia  -     temazepam (RESTORIL) 15 MG capsule; Take 1-2 nightly as needed for insomnia  Dispense: 30 capsule; Refill: 0    5. Encounter for therapeutic drug monitoring  -     Compliance Drug Analysis, Ur - Urine, Clean Catch; Future       As part of this patient's treatment plan, patient will be prescribed controlled substances. The patient has been made aware of appropriate use of such medications, including potential risk of somnolence, limited ability to drive and /or work safely, and potential for dependence or overdose. It has also been made clear that these medications are for use by this patient only, without concomitant use of alcohol or other substances unless prescribed.Controlled substance status of medication discussed with patient, discussed risks of medication including abuse potential and diversion potential and need to follow up for reevaluation appointment in order to receive further refills.    Part of this note may be an electronic transcription/translation of spoken language to printed text using the Dragon Dictation System.      Patient or patient representative verbalized consent for the use of Ambient Listening during the visit with  Nia Veras PA-C for chart documentation. 2/7/2025  16:03 EST

## 2025-02-14 LAB — DRUGS UR: NORMAL

## 2025-08-13 RX ORDER — LEVOTHYROXINE SODIUM 50 MCG
50 TABLET ORAL EVERY MORNING
Qty: 90 TABLET | Refills: 3 | Status: SHIPPED | OUTPATIENT
Start: 2025-08-13